# Patient Record
Sex: MALE | Race: WHITE | NOT HISPANIC OR LATINO
[De-identification: names, ages, dates, MRNs, and addresses within clinical notes are randomized per-mention and may not be internally consistent; named-entity substitution may affect disease eponyms.]

---

## 2019-01-30 ENCOUNTER — TRANSCRIPTION ENCOUNTER (OUTPATIENT)
Age: 44
End: 2019-01-30

## 2020-06-29 DIAGNOSIS — U07.1 COVID-19: ICD-10-CM

## 2020-06-29 PROBLEM — Z00.00 ENCOUNTER FOR PREVENTIVE HEALTH EXAMINATION: Status: ACTIVE | Noted: 2020-06-29

## 2020-06-30 ENCOUNTER — APPOINTMENT (OUTPATIENT)
Dept: PULMONOLOGY | Facility: CLINIC | Age: 45
End: 2020-06-30
Payer: COMMERCIAL

## 2020-06-30 ENCOUNTER — TRANSCRIPTION ENCOUNTER (OUTPATIENT)
Age: 45
End: 2020-06-30

## 2020-06-30 ENCOUNTER — LABORATORY RESULT (OUTPATIENT)
Age: 45
End: 2020-06-30

## 2020-06-30 VITALS
DIASTOLIC BLOOD PRESSURE: 86 MMHG | TEMPERATURE: 98.1 F | SYSTOLIC BLOOD PRESSURE: 127 MMHG | RESPIRATION RATE: 16 BRPM | HEART RATE: 75 BPM

## 2020-06-30 VITALS
WEIGHT: 157 LBS | HEIGHT: 70 IN | OXYGEN SATURATION: 99 % | BODY MASS INDEX: 22.48 KG/M2 | HEART RATE: 76 BPM | TEMPERATURE: 97.2 F

## 2020-06-30 PROCEDURE — 94010 BREATHING CAPACITY TEST: CPT

## 2020-06-30 PROCEDURE — ZZZZZ: CPT

## 2020-06-30 PROCEDURE — 99203 OFFICE O/P NEW LOW 30 MIN: CPT | Mod: 25

## 2020-06-30 PROCEDURE — 94729 DIFFUSING CAPACITY: CPT

## 2020-06-30 PROCEDURE — 94726 PLETHYSMOGRAPHY LUNG VOLUMES: CPT

## 2020-06-30 RX ORDER — CETIRIZINE HCL 10 MG
TABLET ORAL
Refills: 0 | Status: ACTIVE | COMMUNITY

## 2020-06-30 NOTE — HISTORY OF PRESENT ILLNESS
[TextBox_4] : Healthy 45 year old man, post COVID early March.\par \par Feels better, exercising, no SOTOMAYOR\par \par But still feels the need to take a deep breath to fully expand his lungs.\par \par Also with frequent throat clearing. taking zyrtec for allergy sx -- had previously seen [facility] , tested, doesn’t recall results\par

## 2020-06-30 NOTE — PHYSICAL EXAM
[No Acute Distress] : no acute distress [Normal Oropharynx] : normal oropharynx [Normal Appearance] : normal appearance [Normal Rate/Rhythm] : normal rate/rhythm [Normal S1, S2] : normal s1, s2 [No Resp Distress] : no resp distress [Normal Gait] : normal gait [No Clubbing] : no clubbing [Normal Color/ Pigmentation] : normal color/ pigmentation [No Focal Deficits] : no focal deficits [Oriented x3] : oriented x3 [Normal Affect] : normal affect

## 2020-06-30 NOTE — DISCUSSION/SUMMARY
[FreeTextEntry1] : PFT is completely normal\par Reassurance given.\par Trial of Flonase for PND\par AI labs sent today

## 2020-07-02 LAB
A ALTERNATA IGE QN: <0.1 KUA/L
A FUMIGATUS IGE QN: <0.1 KUA/L
BASOPHILS # BLD AUTO: 0.04 K/UL
BASOPHILS NFR BLD AUTO: 1.3 %
BERMUDA GRASS IGE QN: <0.1 KUA/L
BOXELDER IGE QN: <0.1 KUA/L
C HERBARUM IGE QN: <0.1 KUA/L
CALIF WALNUT IGE QN: <0.1 KUA/L
CAT DANDER IGE QN: <0.1 KUA/L
CMN PIGWEED IGE QN: <0.1 KUA/L
COMMON RAGWEED IGE QN: 1.06 KUA/L
COTTONWOOD IGE QN: <0.1 KUA/L
D FARINAE IGE QN: <0.1 KUA/L
D PTERONYSS IGE QN: <0.1 KUA/L
DEPRECATED A ALTERNATA IGE RAST QL: 0
DEPRECATED A FUMIGATUS IGE RAST QL: 0
DEPRECATED BERMUDA GRASS IGE RAST QL: 0
DEPRECATED BOXELDER IGE RAST QL: 0
DEPRECATED C HERBARUM IGE RAST QL: 0
DEPRECATED CAT DANDER IGE RAST QL: 0
DEPRECATED COMMON PIGWEED IGE RAST QL: 0
DEPRECATED COMMON RAGWEED IGE RAST QL: 2
DEPRECATED COTTONWOOD IGE RAST QL: 0
DEPRECATED D FARINAE IGE RAST QL: 0
DEPRECATED D PTERONYSS IGE RAST QL: 0
DEPRECATED DOG DANDER IGE RAST QL: 0
DEPRECATED GOOSEFOOT IGE RAST QL: 0
DEPRECATED LONDON PLANE IGE RAST QL: 0
DEPRECATED MUGWORT IGE RAST QL: 0
DEPRECATED P NOTATUM IGE RAST QL: 0
DEPRECATED RED CEDAR IGE RAST QL: 0
DEPRECATED ROACH IGE RAST QL: 0
DEPRECATED SHEEP SORREL IGE RAST QL: 0
DEPRECATED SILVER BIRCH IGE RAST QL: 0
DEPRECATED TIMOTHY IGE RAST QL: 0
DEPRECATED WHITE ASH IGE RAST QL: 0
DEPRECATED WHITE OAK IGE RAST QL: 0
DOG DANDER IGE QN: <0.1 KUA/L
EOSINOPHIL # BLD AUTO: 0.08 K/UL
EOSINOPHIL NFR BLD AUTO: 2.7 %
GOOSEFOOT IGE QN: <0.1 KUA/L
HCT VFR BLD CALC: 45.1 %
HGB BLD-MCNC: 14.3 G/DL
IMM GRANULOCYTES NFR BLD AUTO: 0 %
LONDON PLANE IGE QN: <0.1 KUA/L
LYMPHOCYTES # BLD AUTO: 0.88 K/UL
LYMPHOCYTES NFR BLD AUTO: 29.2 %
MAN DIFF?: NORMAL
MCHC RBC-ENTMCNC: 29.1 PG
MCHC RBC-ENTMCNC: 31.7 GM/DL
MCV RBC AUTO: 91.9 FL
MONOCYTES # BLD AUTO: 0.41 K/UL
MONOCYTES NFR BLD AUTO: 13.6 %
MUGWORT IGE QN: <0.1 KUA/L
MULBERRY (T70) CLASS: 0
MULBERRY (T70) CONC: <0.1 KUA/L
NEUTROPHILS # BLD AUTO: 1.6 K/UL
NEUTROPHILS NFR BLD AUTO: 53.2 %
P NOTATUM IGE QN: <0.1 KUA/L
PLATELET # BLD AUTO: 236 K/UL
RBC # BLD: 4.91 M/UL
RBC # FLD: 13.8 %
RED CEDAR IGE QN: <0.1 KUA/L
ROACH IGE QN: <0.1 KUA/L
SHEEP SORREL IGE QN: <0.1 KUA/L
SILVER BIRCH IGE QN: <0.1 KUA/L
TIMOTHY IGE QN: <0.1 KUA/L
TOTAL IGE SMQN RAST: 20 KU/L
TREE ALLERG MIX1 IGE QL: 0
WBC # FLD AUTO: 3.01 K/UL
WHITE ASH IGE QN: <0.1 KUA/L
WHITE ELM IGE QN: 0
WHITE ELM IGE QN: <0.1 KUA/L
WHITE OAK IGE QN: <0.1 KUA/L

## 2022-09-02 ENCOUNTER — APPOINTMENT (OUTPATIENT)
Dept: ORTHOPEDIC SURGERY | Facility: CLINIC | Age: 47
End: 2022-09-02

## 2022-09-02 VITALS — WEIGHT: 157 LBS | BODY MASS INDEX: 22.48 KG/M2 | HEIGHT: 70 IN

## 2022-09-02 PROCEDURE — 99203 OFFICE O/P NEW LOW 30 MIN: CPT

## 2022-11-21 ENCOUNTER — NON-APPOINTMENT (OUTPATIENT)
Age: 47
End: 2022-11-21

## 2022-11-29 ENCOUNTER — APPOINTMENT (OUTPATIENT)
Dept: PHYSICAL MEDICINE AND REHAB | Facility: CLINIC | Age: 47
End: 2022-11-29

## 2022-12-08 ENCOUNTER — NON-APPOINTMENT (OUTPATIENT)
Age: 47
End: 2022-12-08

## 2022-12-13 ENCOUNTER — APPOINTMENT (OUTPATIENT)
Dept: PHYSICAL MEDICINE AND REHAB | Facility: CLINIC | Age: 47
End: 2022-12-13

## 2022-12-13 DIAGNOSIS — M54.12 RADICULOPATHY, CERVICAL REGION: ICD-10-CM

## 2022-12-13 PROCEDURE — 62321 NJX INTERLAMINAR CRV/THRC: CPT

## 2022-12-13 NOTE — PROCEDURE
[de-identified] : Patient Name:  ANGEL MOREL \par Date:	Dec 13, 2022 \par Attending Physician: RADHA WOODSON MD\par Procedure:  FLOUROSCOPICALLY GUIDED CONTRAST ENHANCED LEFT C7-T1 INTER-LAMINAR EPIDURAL STEROID AND ANESTHETIC INJECTION\par Pre/Post Diagnosis:  CERVICAL RADICULOPATHY  M54.12\par Anesthesia:  LOCAL WITH 3 mL 1% LIDOCAINE WITHOUT EPINEPHRINE\par Injectate:  A TOTAL OF 5mL CONSISTING OF 2 mL DEXAMETHASONE (10MG/mL) AND 3 mL 1% LIDOCAINE PRESERVATIVE FREE\par \par Time In:  11:00AM 			Time out: 11:30AM\par \par After detailed review of the patient’s history, physical examination, and imaging studies, it was determined that the above procedure was medically indicated for further diagnostic and therapeutic value.\par \par Informed consent was signed and all patient questions were answered including the risks, benefits, alternative treatment options, and prognosis. The risks include but are not limited to infection, allergic reaction, nerve damage, stroke, paralysis, epidural hematoma, syncope, headache, respiratory or cardiac arrest, spinal cord injury, and scar formation.\par \par Vital signs were obtained and found to be stable. The patient was taken to the special procedures suite and placed in the prone position on the fluoroscopy table. The skin overlying the treatment area was prepped and draped in a sterile fashion using three Betadine washes and a fenestrated drape. The indicated treatment area was localized under direct fluoroscopic visualization. The overlying soft tissue was then infiltrated in a sterile fashion with the above mentioned local anesthetic.  \par \par Under fluoroscopic guidance, in the AP view, the region overlying the inferior lamina on the C7-T1 level was visualized. An 18 gauge Tuohy needle was inserted into the epidural space using a paramedian approach. The epidural space was localized using biplanar imaging and loss of resistance technique. After negative aspirate for air, blood, and CSF, a 1-2 mL volume of Omnipaque was instilled under live fluoroscopy and flow of contrast was noted to further confirm there was no intra-vascular or intra-thecal flow.  Fluoroscopic images were saved for reference as part of the patient’s permanent chart.\par \par Once needle placement was appropriately confirmed, the above mentioned injectate solution was administered.\par     \par At the conclusion of the procedure, all needles were clearly withdrawn and sterile dressings were placed. The patient was taken to the post-procedure recovery area and placed under observation.       \par \par The patient was monitored for any adverse reactions and none were noted. The patient’s neurological examination remained stable at all times. The patient tolerated the procedure well and was discharged in good condition after an appropriate period of observation. Post-procedure instructions were given to the patient. If there are any complications, the patient was instructed to call us. The patient is to follow-up in the office within two weeks.\par

## 2023-01-06 ENCOUNTER — APPOINTMENT (OUTPATIENT)
Dept: PHYSICAL MEDICINE AND REHAB | Facility: CLINIC | Age: 48
End: 2023-01-06